# Patient Record
Sex: FEMALE | Race: WHITE | URBAN - METROPOLITAN AREA
[De-identification: names, ages, dates, MRNs, and addresses within clinical notes are randomized per-mention and may not be internally consistent; named-entity substitution may affect disease eponyms.]

---

## 2021-10-25 ENCOUNTER — APPOINTMENT (OUTPATIENT)
Age: 21
Setting detail: DERMATOLOGY
End: 2021-10-26

## 2021-10-25 DIAGNOSIS — L85.3 XEROSIS CUTIS: ICD-10-CM

## 2021-10-25 DIAGNOSIS — L72.0 EPIDERMAL CYST: ICD-10-CM

## 2021-10-25 DIAGNOSIS — L42 PITYRIASIS ROSEA: ICD-10-CM

## 2021-10-25 PROCEDURE — 99203 OFFICE O/P NEW LOW 30 MIN: CPT

## 2021-10-25 PROCEDURE — OTHER COUNSELING: OTHER

## 2021-10-25 PROCEDURE — OTHER OTHER: OTHER

## 2021-10-25 PROCEDURE — OTHER TREATMENT REGIMEN: OTHER

## 2021-10-25 ASSESSMENT — LOCATION SIMPLE DESCRIPTION DERM
LOCATION SIMPLE: RIGHT PRETIBIAL REGION
LOCATION SIMPLE: LEFT FOREARM
LOCATION SIMPLE: LEFT PRETIBIAL REGION
LOCATION SIMPLE: LEFT POSTERIOR THIGH
LOCATION SIMPLE: RIGHT FOREARM
LOCATION SIMPLE: ABDOMEN
LOCATION SIMPLE: RIGHT POSTERIOR THIGH
LOCATION SIMPLE: RIGHT SUPERIOR EYELID

## 2021-10-25 ASSESSMENT — LOCATION DETAILED DESCRIPTION DERM
LOCATION DETAILED: RIGHT DISTAL POSTERIOR THIGH
LOCATION DETAILED: RIGHT PROXIMAL PRETIBIAL REGION
LOCATION DETAILED: RIGHT PROXIMAL DORSAL FOREARM
LOCATION DETAILED: RIGHT RIB CAGE
LOCATION DETAILED: LEFT PROXIMAL DORSAL FOREARM
LOCATION DETAILED: LEFT PROXIMAL PRETIBIAL REGION
LOCATION DETAILED: RIGHT LATERAL SUPERIOR EYELID
LOCATION DETAILED: LEFT DISTAL POSTERIOR THIGH

## 2021-10-25 ASSESSMENT — LOCATION ZONE DERM
LOCATION ZONE: EYELID
LOCATION ZONE: TRUNK
LOCATION ZONE: ARM
LOCATION ZONE: LEG

## 2021-10-25 NOTE — PROCEDURE: OTHER
Render Risk Assessment In Note?: yes
Other (Free Text): present x 2 months\\ngetting better\\noffered pt a bx, but as it is resolving, she prefers to clinically monitor.
Detail Level: Detailed
Note Text (......Xxx Chief Complaint.): This diagnosis correlates with the